# Patient Record
(demographics unavailable — no encounter records)

---

## 2025-05-27 NOTE — DISCUSSION/SUMMARY
[EKG obtained to assist in diagnosis and management of assessed problem(s)] : EKG obtained to assist in diagnosis and management of assessed problem(s) [FreeTextEntry1] : EKG performed today was unremarkable.  SOB on Exertion: Recommend an echocardiogram to evaluate LV function and rule out valvular heart disease.  HLD: Recommend a CAC score to risk stratify the patient. Lab requisition provided to check Lipoprotein A and Apolipoprotein B. There are no changes in medication management. Continue Rosuvastatin 10mg. Other planned treatment includes diet modification, exercise, and weight loss.  Instructed to follow up after testing is complete.  Plan was discussed with the patient.   I, Dr. Rock, personally performed the evaluation and management services for this patient. That E&M includes reviewing prior history/tests, conducting the medically appropriate examination and evaluation, assessing all conditions, establishing a plan of care, ordering tests, and counselling and educating the patient. I spent a total of 45 minutes on today's encounter evaluating and treating the patient.

## 2025-05-27 NOTE — CARDIOLOGY SUMMARY
[de-identified] : 05/27/2025: NSR, normal axis, normal intervals, (-) ST-T wave changes. =======================================================

## 2025-05-27 NOTE — HISTORY OF PRESENT ILLNESS
[FreeTextEntry1] : LUANN DOMINGUEZ is a 30-year-old male, with a PMHx significant for familial hypercholesterolemia (on statin x3 years), who presents today for cardiac evaluation. Endorses SOB with exertion, which is relieved with rest. Denies any other complaints at this time. Otherwise: (-) chest pain, (-) cough, (-) hemoptysis, (-) leg swelling/pain, (-) recent travel, (-) prolonged immobility.  Family History: (+) CAD: uncle.

## 2025-05-27 NOTE — CARDIOLOGY SUMMARY
[de-identified] : 05/27/2025: NSR, normal axis, normal intervals, (-) ST-T wave changes. =======================================================

## 2025-07-03 NOTE — CARDIOLOGY SUMMARY
[de-identified] : 05/27/2025: NSR, normal axis, normal intervals, (-) ST-T wave changes. ======================================================= [de-identified] : CT Cardiac Calcium Scoring - 06/11/2025: IMPRESSION:  Total coronary artery calcium score: 0. =======================================================

## 2025-07-03 NOTE — HISTORY OF PRESENT ILLNESS
[FreeTextEntry1] : LUANN DOMINGUEZ is a 30-year-old male, with a PMHx significant for familial hypercholesterolemia (on statin x3 years), who presents today for a follow-up visit. Since his last visit, the patient has undergone a Cardiac CT that demonstrated a CAC score of 0. Labs revealed LpA and ApoB are WNL.   Family History: (+) CAD: uncle.

## 2025-07-03 NOTE — DISCUSSION/SUMMARY
[FreeTextEntry1] : HLD: CACS = 0. There are no changes in medication management. Continue Rosuvastatin 10mg. Other planned treatment includes diet modification, exercise, and weight loss.  Instructed to follow up PRN.  Plan was discussed with the patient.   I, Dr. Rock, personally performed the evaluation and management services for this patient. That E&M includes reviewing prior history/tests, conducting the medically appropriate examination and evaluation, assessing all conditions, establishing a plan of care, ordering tests, and counselling and educating the patient. I spent a total of 45 minutes on today's encounter evaluating and treating the patient.